# Patient Record
Sex: FEMALE | Race: WHITE
[De-identification: names, ages, dates, MRNs, and addresses within clinical notes are randomized per-mention and may not be internally consistent; named-entity substitution may affect disease eponyms.]

---

## 2020-09-16 ENCOUNTER — HOSPITAL ENCOUNTER (EMERGENCY)
Dept: HOSPITAL 7 - FB.ED | Age: 55
LOS: 1 days | Discharge: NURSING, REHAB OR LONG-TERM CARE | End: 2020-09-17
Payer: COMMERCIAL

## 2020-09-16 DIAGNOSIS — K27.5: Primary | ICD-10-CM

## 2020-09-16 DIAGNOSIS — Z98.51: ICD-10-CM

## 2020-09-16 DIAGNOSIS — Z88.1: ICD-10-CM

## 2020-09-16 DIAGNOSIS — I10: ICD-10-CM

## 2020-09-16 DIAGNOSIS — Z79.899: ICD-10-CM

## 2020-09-16 PROCEDURE — C9113 INJ PANTOPRAZOLE SODIUM, VIA: HCPCS

## 2020-09-16 PROCEDURE — 96365 THER/PROPH/DIAG IV INF INIT: CPT

## 2020-09-16 PROCEDURE — 96376 TX/PRO/DX INJ SAME DRUG ADON: CPT

## 2020-09-16 PROCEDURE — 83690 ASSAY OF LIPASE: CPT

## 2020-09-16 PROCEDURE — 36415 COLL VENOUS BLD VENIPUNCTURE: CPT

## 2020-09-16 PROCEDURE — 96361 HYDRATE IV INFUSION ADD-ON: CPT

## 2020-09-16 PROCEDURE — 99285 EMERGENCY DEPT VISIT HI MDM: CPT

## 2020-09-16 PROCEDURE — 96375 TX/PRO/DX INJ NEW DRUG ADDON: CPT

## 2020-09-16 PROCEDURE — 80053 COMPREHEN METABOLIC PANEL: CPT

## 2020-09-16 PROCEDURE — 82150 ASSAY OF AMYLASE: CPT

## 2020-09-16 PROCEDURE — 74177 CT ABD & PELVIS W/CONTRAST: CPT

## 2020-09-16 PROCEDURE — 85025 COMPLETE CBC W/AUTO DIFF WBC: CPT

## 2020-09-16 PROCEDURE — 81001 URINALYSIS AUTO W/SCOPE: CPT

## 2020-09-16 NOTE — EDM.PDOC
ED HPI GENERAL MEDICAL PROBLEM





- General


Stated Complaint: SOB


Time Seen by Provider: 09/16/20 21:35


Source of Information: Reports: Patient, Family


History Limitations: Reports: No Limitations





- History of Present Illness


INITIAL COMMENTS - FREE TEXT/NARRATIVE: 





Patient presented to the ED because of abdominal pain, N/V x 3  today. The pain 

is 10/10, cramping. There is no fever,chills, cough or cold symptoms. There is 

no changes in bowel movements or urinary symptoms. Patient has a history of 

alcoholic liver disease but have been sober for 3 months now.





- Related Data


                                    Allergies











Allergy/AdvReac Type Severity Reaction Status Date / Time


 


cephalexin Allergy  Vomiting Verified 04/24/18 10:21











Home Meds: 


                                    Home Meds





Furosemide 20 mg PO DAILY 03/14/18 [History]


Potassium 1 tab PO BID 04/10/18 [History]


Spironolactone [Aldactone] 50 mg PO DAILY 04/10/18 [History]











Past Medical History


HEENT History: Reports: Impaired Vision


Cardiovascular History: Reports: Hypertension


OB/GYN History: Reports: Other (See Below)


Other OB/GYN History: TUBAL LIGATION





- Past Surgical History


Other Musculoskeletal Surgeries/Procedures:: FOOT SURGERY





Social & Family History





- Family History


Family Medical History: Noncontributory





- Caffeine Use


Caffeine Use: Reports: Coffee





ED ROS GENERAL





- Review of Systems


Review Of Systems: See Below


Constitutional: Reports: No Symptoms


HEENT: Reports: No Symptoms


Respiratory: Reports: No Symptoms


Cardiovascular: Reports: No Symptoms


Endocrine: Reports: No Symptoms


GI/Abdominal: Reports: Abdominal Pain, Nausea, Vomiting


: Reports: No Symptoms


Musculoskeletal: Reports: No Symptoms


Skin: Reports: No Symptoms





ED EXAM, GI/ABD





- Physical Exam


Exam: See Below


Exam Limited By: No Limitations


General Appearance: Alert, No Apparent Distress


Ears: Normal External Exam, Normal Canal, Hearing Grossly Normal


Nose: Normal Inspection


Throat/Mouth: Normal Inspection, Normal Lips, Normal Teeth, Normal Gums


Head: Atraumatic, Normocephalic


Neck: Normal Inspection, Supple, Non-Tender, Full Range of Motion


Respiratory/Chest: No Respiratory Distress, Lungs Clear, Normal Breath Sounds


Cardiovascular: Normal Peripheral Pulses, Regular Rate, Rhythm, No Edema


GI/Abdominal Exam: Normal Bowel Sounds, Soft, Tender


Back Exam: Normal Inspection, Full Range of Motion


Extremities: Normal Inspection, Normal Range of Motion, Non-Tender





Course





- Vital Signs


Text/Narrative:: 





Labs, CT abd-pelvis was discussed with patient


NS 1 L bolus


Zofran 4 mg IV


Protonix 80 mg IV


Morphine 2 mg IV x2


Dilaudid 2 mg IV x1


Zosyn 4.5 gm IV x1





- Orders/Labs/Meds


Orders: 


                               Active Orders 24 hr











 Category Date Time Status


 


 Abdomen Pelvis w Cont [CT] Stat Exams  09/16/20 21:05 Taken


 


 UA W/MICROSCOPIC [URIN] Stat Lab  09/16/20 20:58 Ordered


 


 Sodium Chloride 0.9% [Normal Saline] 1,000 ml Med  09/16/20 21:00 Active





 IV ASDIRECTED   


 


 Sodium Chloride 0.9% [Saline Flush] Med  09/16/20 20:58 Active





 10 ml FLUSH ASDIRECTED PRN   


 


 Saline Lock Insert [OM.PC] Routine Oth  09/16/20 20:58 Ordered








                                Medication Orders





Sodium Chloride (Normal Saline)  1,000 mls @ 999 mls/hr IV ASDIRECTED SUKHDEEP


   Last Admin: 09/16/20 21:59  Dose: 999 mls/hr


   Documented by: REX


Sodium Chloride (Saline Flush)  10 ml FLUSH ASDIRECTED PRN


   PRN Reason: Keep Vein Open


   Last Admin: 09/16/20 23:55  Dose: 10 ml


   Documented by: REX








Labs: 


                                Laboratory Tests











  09/16/20 09/16/20 09/16/20 Range/Units





  21:20 21:20 21:20 


 


WBC  11.0    (4.5-12.0)  X10-3/uL


 


RBC  4.01    (3.23-5.20)  x10(6)uL


 


Hgb  13.8    (11.5-15.5)  g/dL


 


Hct  40.7    (30.0-51.3)  %


 


MCV  101.6 H    (80-96)  fL


 


MCH  34.3 H    (27.7-33.6)  pg


 


MCHC  33.8    (32.2-35.4)  g/dL


 


RDW  14.4    (11.5-15.5)  %


 


Plt Count  156    (125-369)  X10(3)uL


 


MPV  9.8    (7.4-10.4)  fL


 


Add Manual Diff  Yes    


 


Neutrophils % (Manual)  88 H    (46-82)  %


 


Band Neutrophils %  4    (0-6)  %


 


Lymphocytes % (Manual)  5 L    (13-37)  %


 


Monocytes % (Manual)  3 L    (4-12)  %


 


Sodium   139   (135-145)  mmol/L


 


Potassium   3.3 L   (3.5-5.3)  mmol/L


 


Chloride   97 L   (100-110)  mmol/L


 


Carbon Dioxide   27   (21-32)  mmol/L


 


BUN   9   (7-18)  mg/dL


 


Creatinine   0.9   (0.55-1.02)  mg/dL


 


Est Cr Clr Drug Dosing   TNP   


 


Estimated GFR (MDRD)   > 60   (>60)  


 


BUN/Creatinine Ratio   10.0   (9-20)  


 


Glucose   162 H   ()  mg/dL


 


Calcium   10.0   (8.6-10.2)  mg/dL


 


Total Bilirubin   3.6 H   (0.1-1.3)  mg/dL


 


AST   314 H*   (5-25)  IU/L


 


ALT   110 H   (12-36)  U/L


 


Alkaline Phosphatase   130 H   ()  IU/L


 


Total Protein   7.8   (6.0-8.0)  g/dL


 


Albumin   3.5   (3.5-5.2)  g/dL


 


Globulin   4.3   g/dL


 


Albumin/Globulin Ratio   0.8   


 


Amylase   60   ()  U/L


 


Lipase    193  ()  U/L











Meds: 


Medications











Generic Name Dose Route Start Last Admin





  Trade Name Freq  PRN Reason Stop Dose Admin


 


Sodium Chloride  1,000 mls @ 999 mls/hr  09/16/20 21:00  09/16/20 21:59





  Normal Saline  IV   999 mls/hr





  ASDIRECTED SUKHDEEP   Administration


 


Sodium Chloride  10 ml  09/16/20 20:58  09/16/20 23:55





  Saline Flush  FLUSH   10 ml





  ASDIRECTED PRN   Administration





  Keep Vein Open  














Discontinued Medications














Generic Name Dose Route Start Last Admin





  Trade Name Freq  PRN Reason Stop Dose Admin


 


Hydromorphone HCl  2 mg  09/16/20 23:31  09/16/20 23:43





  Dilaudid  IVPUSH  09/16/20 23:32  2 mg





  ONETIME ONE   Administration


 


Piperacillin Sod/Tazobactam  100 mls @ 200 mls/hr  09/16/20 23:31  09/16/20 

23:43





  Sod 4.5 gm/ Sodium Chloride  IV  09/17/20 00:00  200 mls/hr





  NOW STA   Administration


 


Iopamidol  100 ml  09/16/20 22:18  09/16/20 22:28





  Isovue-370 (76%)  IV  09/16/20 22:19  75 ml





  .AS DIRECTED ONE   Administration


 


Morphine Sulfate  2 mg  09/16/20 21:00  09/16/20 21:33





  Morphine  IVPUSH  09/16/20 21:01  2 mg





  ONETIME ONE   Administration


 


Morphine Sulfate  2 mg  09/16/20 21:54  09/16/20 21:59





  Morphine  IVPUSH  09/16/20 21:55  2 mg





  NOW STA   Administration


 


Ondansetron HCl  4 mg  09/16/20 20:58  09/16/20 21:34





  Zofran  IVPUSH  09/16/20 20:59  4 mg





  ONETIME ONE   Administration


 


Pantoprazole Sodium  80 mg  09/16/20 23:31  09/16/20 23:40





  Protonix Iv***  IVPUSH  09/16/20 23:32  80 mg





  .BOLUS ONE   Administration


 


Pantoprazole Sodium  Confirm  09/16/20 23:41  09/16/20 23:54





  Protonix Iv***  Administered  09/16/20 23:42  Not Given





  Dose  





  40 mg  





  .ROUTE  





  .STK-MED ONE  














Departure





- Departure


Time of Disposition: 23:40


Disposition: DC/[facility] to [facility]


Condition: Good


Clinical Impression: 


 Perforated ulcer, Abdominal pain








- Discharge Information


Referrals: 


May Ventura, NP [Primary Care Provider] - 





- My Orders


Last 24 Hours: 


My Active Orders





09/16/20 20:58


UA W/MICROSCOPIC [URIN] Stat 


Sodium Chloride 0.9% [Saline Flush]   10 ml FLUSH ASDIRECTED PRN 


Saline Lock Insert [OM.PC] Routine 





09/16/20 21:00


Sodium Chloride 0.9% [Normal Saline] 1,000 ml IV ASDIRECTED 





09/16/20 21:05


Abdomen Pelvis w Cont [CT] Stat 














- Assessment/Plan


Last 24 Hours: 


My Active Orders





09/16/20 20:58


UA W/MICROSCOPIC [URIN] Stat 


Sodium Chloride 0.9% [Saline Flush]   10 ml FLUSH ASDIRECTED PRN 


Saline Lock Insert [OM.PC] Routine 





09/16/20 21:00


Sodium Chloride 0.9% [Normal Saline] 1,000 ml IV ASDIRECTED 





09/16/20 21:05


Abdomen Pelvis w Cont [CT] Stat

## 2020-09-17 VITALS — DIASTOLIC BLOOD PRESSURE: 65 MMHG | SYSTOLIC BLOOD PRESSURE: 112 MMHG | HEART RATE: 87 BPM
